# Patient Record
Sex: MALE
[De-identification: names, ages, dates, MRNs, and addresses within clinical notes are randomized per-mention and may not be internally consistent; named-entity substitution may affect disease eponyms.]

---

## 2020-09-05 ENCOUNTER — HOSPITAL ENCOUNTER (EMERGENCY)
Dept: HOSPITAL 88 - FSED | Age: 35
Discharge: HOME | End: 2020-09-05
Payer: COMMERCIAL

## 2020-09-05 VITALS — HEIGHT: 65 IN | WEIGHT: 173 LBS | BODY MASS INDEX: 28.82 KG/M2

## 2020-09-05 DIAGNOSIS — R07.89: Primary | ICD-10-CM
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
## 2020-09-05 PROCEDURE — 99282 EMERGENCY DEPT VISIT SF MDM: CPT
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
